# Patient Record
Sex: FEMALE | Race: WHITE | URBAN - METROPOLITAN AREA
[De-identification: names, ages, dates, MRNs, and addresses within clinical notes are randomized per-mention and may not be internally consistent; named-entity substitution may affect disease eponyms.]

---

## 2021-08-13 ENCOUNTER — OFFICE VISIT (OUTPATIENT)
Dept: URBAN - METROPOLITAN AREA CLINIC 30 | Facility: CLINIC | Age: 61
End: 2021-08-13
Payer: COMMERCIAL

## 2021-08-13 DIAGNOSIS — H47.10 UNSPECIFIED PAPILLEDEMA: ICD-10-CM

## 2021-08-13 PROCEDURE — 92250 FUNDUS PHOTOGRAPHY W/I&R: CPT | Performed by: OPTOMETRIST

## 2021-08-13 PROCEDURE — 99204 OFFICE O/P NEW MOD 45 MIN: CPT | Performed by: OPTOMETRIST

## 2021-08-13 PROCEDURE — 92133 CPTRZD OPH DX IMG PST SGM ON: CPT | Performed by: OPTOMETRIST

## 2021-08-13 ASSESSMENT — VISUAL ACUITY
OD: 20/60
OS: 20/50

## 2021-08-13 ASSESSMENT — INTRAOCULAR PRESSURE
OS: 17
OD: 15

## 2021-08-13 ASSESSMENT — KERATOMETRY
OD: 46.42
OS: 46.52

## 2021-08-13 NOTE — IMPRESSION/PLAN
Impression: Unspecified papilledema: H47.10. Plan: Decreased BCVA onset ~ 1 year. Pt notes headaches/pressure and TVO's when bending over. PT has papilledema vs. bilateral papillitis. Blurred disc margins and elevated discs. Hx of Htn and DM. Current A1C 7.3. Some hx of intermittent HA's. Will order STAT MRI w/w/o contrast of Brain and orbits. Needs neurology consult.

## 2021-08-13 NOTE — IMPRESSION/PLAN
Impression: Diabetes mellitus Type 2 with mild non-proliferative retinopathy without macular edema, bilateral: J35.7227. Plan: Emphasized importance of strict BS control, diet, exercise, and BP control to avoid risk of loss of vision. Current A1C ~7.3 per pt.  Refer to retina team

## 2021-08-20 ENCOUNTER — OFFICE VISIT (OUTPATIENT)
Dept: URBAN - METROPOLITAN AREA CLINIC 30 | Facility: CLINIC | Age: 61
End: 2021-08-20
Payer: COMMERCIAL

## 2021-08-20 PROCEDURE — 92083 EXTENDED VISUAL FIELD XM: CPT | Performed by: OPTOMETRIST

## 2021-08-20 PROCEDURE — 99213 OFFICE O/P EST LOW 20 MIN: CPT | Performed by: OPTOMETRIST

## 2021-08-20 ASSESSMENT — INTRAOCULAR PRESSURE
OS: 14
OD: 16

## 2021-08-20 NOTE — IMPRESSION/PLAN
Impression: Unspecified papilledema: H47.10. Plan: Likely IIH/PTC. Decreased BCVA onset ~ 1 year. Pt notes headaches/pressure and TVO's when bending over. PT has papilledema vs. bilateral papillitis. Blurred disc margins and elevated discs. Hx of Htn and DM. Current A1C 7.3. Some hx of intermittent HA's. Received MRI w/w/o contrast of Brain and orbits 8/2021 showing mild left optic nerve swelling. Imaging less reliable due to movement during exam.  Needs neurology consult and possible LP with CSF analysis. 

VF 30-2; OD) rim defect; OS) inf arc defect

## 2021-11-22 ENCOUNTER — OFFICE VISIT (OUTPATIENT)
Dept: URBAN - METROPOLITAN AREA CLINIC 30 | Facility: CLINIC | Age: 61
End: 2021-11-22
Payer: COMMERCIAL

## 2021-11-22 PROCEDURE — 99213 OFFICE O/P EST LOW 20 MIN: CPT | Performed by: OPTOMETRIST

## 2021-11-22 PROCEDURE — 92083 EXTENDED VISUAL FIELD XM: CPT | Performed by: OPTOMETRIST

## 2021-11-22 PROCEDURE — 92250 FUNDUS PHOTOGRAPHY W/I&R: CPT | Performed by: OPTOMETRIST

## 2021-11-22 PROCEDURE — 92133 CPTRZD OPH DX IMG PST SGM ON: CPT | Performed by: OPTOMETRIST

## 2021-11-22 ASSESSMENT — VISUAL ACUITY
OS: 20/30
OD: 20/40

## 2021-11-22 ASSESSMENT — INTRAOCULAR PRESSURE
OS: 18
OD: 17

## 2021-11-22 ASSESSMENT — KERATOMETRY
OD: 46.46
OS: 46.21

## 2021-11-22 NOTE — IMPRESSION/PLAN
Impression: Unspecified papilledema: H47.10. Plan: Likely IIH/PTC. Decreased BCVA onset ~ 1 year. Pt notes headaches/pressure and TVO's when bending over. Blurred disc margins and elevated discs. Hx of Htn and DM. Current A1C 7.3. Some hx of intermittent HA's. Received MRI w/w/o contrast of Brain and orbits 8/2021 showing mild left optic nerve swelling. Imaging less reliable due to movement during exam.  Pt seeing Kim Szymanski M.D. neurology. Pt had LP and CFS analysis performed 10/2021. Elevated OP per pt. currently Diamox  1,000 mg daily. VF 30-2; OD) rim defect; OS) inf arc defect 11/2021  VF 30-2 OD) few peripheral defect WNL OS) WNL improved.  
11/2021 RNFL Improved overall

## 2021-11-22 NOTE — IMPRESSION/PLAN
Impression: Diabetes mellitus Type 2 with mild non-proliferative retinopathy without macular edema, bilateral: K78.5738. Plan: Emphasized importance of strict BS control, diet, exercise, and BP control to avoid risk of loss of vision. Current A1C ~7.3 per pt.

## 2022-04-01 ENCOUNTER — OFFICE VISIT (OUTPATIENT)
Dept: URBAN - METROPOLITAN AREA CLINIC 30 | Facility: CLINIC | Age: 62
End: 2022-04-01
Payer: COMMERCIAL

## 2022-04-01 DIAGNOSIS — E11.3293 DIABETES MELLITUS TYPE 2 WITH MILD NON-PROLIFERATIVE RETINOPATHY WITHOUT MACULAR EDEMA, BILATERAL: ICD-10-CM

## 2022-04-01 DIAGNOSIS — H25.813 COMBINED FORMS OF AGE-RELATED CATARACT, BILATERAL: ICD-10-CM

## 2022-04-01 PROCEDURE — 92250 FUNDUS PHOTOGRAPHY W/I&R: CPT | Performed by: OPTOMETRIST

## 2022-04-01 PROCEDURE — 92014 COMPRE OPH EXAM EST PT 1/>: CPT | Performed by: OPTOMETRIST

## 2022-04-01 PROCEDURE — 92083 EXTENDED VISUAL FIELD XM: CPT | Performed by: OPTOMETRIST

## 2022-04-01 PROCEDURE — 92133 CPTRZD OPH DX IMG PST SGM ON: CPT | Performed by: OPTOMETRIST

## 2022-04-01 ASSESSMENT — INTRAOCULAR PRESSURE
OS: 17
OD: 17

## 2022-04-01 NOTE — IMPRESSION/PLAN
Impression: Unspecified papilledema: H47.10. Plan: IIH/PTC. Decreased BCVA onset ~ 2020. Headaches/pressure and TVO's when bending over have improved over past six months. Pt still notes some dizziness/vertigo and diplopia. Does not appear to be true diplopia but possibly cataract related. No disc edema noted on exam today but some disc elevation (optic disc drusen) Hx of Htn and DM. Some hx of intermittent HA's. Previous MRI w/w/o contrast of Brain and orbits 8/2021 showing mild left optic nerve swelling. Imaging less reliable due to movement during exam.  Pt seeing Merlinda Bough M.D. neurology. Pt had LP and CFS analysis performed 10/2021. Hx of elevated OP. Currently Diamox  1,500 mg daily. VF 30-2; OD) rim defect; OS) inf arc defect 11/2021  VF 30-2 OD) few peripheral defect WNL OS) WNL improved.  
11/2021 RNFL Improved overall

## 2022-04-01 NOTE — IMPRESSION/PLAN
Impression: Diabetes mellitus Type 2 with mild non-proliferative retinopathy without macular edema, bilateral: L83.0018. Plan: Emphasized importance of strict BS control, diet, exercise, and BP control to avoid risk of loss of vision. Current A1C ~7.3 per pt.

## 2022-04-01 NOTE — IMPRESSION/PLAN
Impression: Combined forms of age-related cataract, bilateral: H25.813. Plan: Cataracts account for the patient's complaints. No treatment currently recommended. Will consider sx in 6 mos.

## 2022-04-01 NOTE — IMPRESSION/PLAN
Impression: Unspecified papilledema: H47.10. Plan: Likely IIH/PTC. Decreased BCVA onset ~ 1 year. Pt notes headaches/pressure and TVO's when bending over. Blurred disc margins and elevated discs. Hx of Htn and DM. Current A1C 7.3. Some hx of intermittent HA's. Prev MRI w/w/o contrast of Brain and orbits 8/2021 showing mild left optic nerve swelling. Imaging less reliable due to movement during exam.  Pt seeing Janes Velez M.D. neurology. Pt had LP and CFS analysis performed 10/2021. Hx of elevated OP per pt. Currently on Diamox 1,500 mg daily. 03/2022 VF 30-2; OD) mild rim defect OS) inf NS  ( VF is grossly stable- NO WORSE)
03/2022 RNFL appears stable, no swelling VF 30-2; OD) rim defect; OS) inf arc defect 11/2021  VF 30-2 OD) few peripheral defect WNL OS) WNL improved.  
11/2021 RNFL Improved overall

## 2022-10-07 ENCOUNTER — OFFICE VISIT (OUTPATIENT)
Dept: URBAN - METROPOLITAN AREA CLINIC 30 | Facility: CLINIC | Age: 62
End: 2022-10-07

## 2022-10-07 DIAGNOSIS — H47.10 UNSPECIFIED PAPILLEDEMA: ICD-10-CM

## 2022-10-07 DIAGNOSIS — E11.3293 DIABETES MELLITUS TYPE 2 WITH MILD NON-PROLIFERATIVE RETINOPATHY WITHOUT MACULAR EDEMA, BILATERAL: Primary | ICD-10-CM

## 2022-10-07 DIAGNOSIS — H25.813 COMBINED FORMS OF AGE-RELATED CATARACT, BILATERAL: ICD-10-CM

## 2022-10-07 DIAGNOSIS — Z79.84 LONG TERM (CURRENT) USE OF ORAL ANTIDIABETIC DRUGS: ICD-10-CM

## 2022-10-07 PROCEDURE — 92250 FUNDUS PHOTOGRAPHY W/I&R: CPT | Performed by: OPTOMETRIST

## 2022-10-07 PROCEDURE — 92134 CPTRZ OPH DX IMG PST SGM RTA: CPT | Performed by: OPTOMETRIST

## 2022-10-07 PROCEDURE — 92014 COMPRE OPH EXAM EST PT 1/>: CPT | Performed by: OPTOMETRIST

## 2022-10-07 PROCEDURE — 92133 CPTRZD OPH DX IMG PST SGM ON: CPT | Performed by: OPTOMETRIST

## 2022-10-07 ASSESSMENT — VISUAL ACUITY
OD: 20/25
OS: 20/20

## 2022-10-07 ASSESSMENT — INTRAOCULAR PRESSURE
OS: 11
OD: 12

## 2022-10-07 ASSESSMENT — KERATOMETRY
OS: 46.46
OD: 46.52

## 2022-10-07 NOTE — IMPRESSION/PLAN
Impression: Diabetes mellitus Type 2 with mild non-proliferative retinopathy without macular edema, bilateral: G25.0014. Plan: No diabetic retinopathy OU. No Diabetic Macular Edema noted OU. Recommend yearly diabetic eye exam. Emphasized importance of strict BS control, diet, exercise, and BP control to avoid risk of loss of vision. Recommend regular, ongoing follow-ups with their PCP to monitor DM as well. Most recent A1C 6.1, per pt.

## 2023-04-24 ENCOUNTER — OFFICE VISIT (OUTPATIENT)
Dept: URBAN - METROPOLITAN AREA CLINIC 30 | Facility: CLINIC | Age: 63
End: 2023-04-24
Payer: COMMERCIAL

## 2023-04-24 DIAGNOSIS — H47.10 UNSPECIFIED PAPILLEDEMA: Primary | ICD-10-CM

## 2023-04-24 PROCEDURE — 92083 EXTENDED VISUAL FIELD XM: CPT | Performed by: OPTOMETRIST

## 2023-04-24 PROCEDURE — 99213 OFFICE O/P EST LOW 20 MIN: CPT | Performed by: OPTOMETRIST

## 2023-04-24 ASSESSMENT — INTRAOCULAR PRESSURE
OS: 16
OD: 13

## 2023-04-24 NOTE — IMPRESSION/PLAN
Impression: Unspecified papilledema: H47.10. Plan: IIH/PTC. Hx of decreased BCVA onset ~ 2020. Prev hx of headaches/pressure and TVO's when bending over have resolved. Pt feels  some dizziness/vertigo and diplopia persists, but clinical appearance again is unchanged. Does not appear to be true diplopia again but slightly blurred vision. Again, no disc edema noted on exam today but some disc elevation (optic disc drusen). Confirmed on high resolution RNFL OCT OU. Hx of Htn and DM. Some hx of intermittent HA's. Pt still seeing Keturah Murphy M.D. Neurology. Pt had LP and CFS analysis performed 10/2021. Hx of elevated OP. Currently on Diamox  250 mg QD PO- reduced due to some renal toxicity. Send updated progress notes. RTC x 6 months. Baseline VF 30-2; OD) rim defect; OS) inf arc defect 11/2021  VF 30-2 OD) few peripheral defect WNL OS) WNL improved. 4/2023 VF 24-2 OU) GHT Borderline 11/2021 RNFL Improved overall 10/2022 RNFL OD) sup: 67, OS) sup: 84, inf: 76 

10/2022 Disc photos-stable.
